# Patient Record
Sex: FEMALE | Race: WHITE | Employment: OTHER | URBAN - NONMETROPOLITAN AREA
[De-identification: names, ages, dates, MRNs, and addresses within clinical notes are randomized per-mention and may not be internally consistent; named-entity substitution may affect disease eponyms.]

---

## 2019-06-17 ENCOUNTER — HOSPITAL ENCOUNTER (OUTPATIENT)
Dept: AUDIOLOGY | Age: 84
Discharge: HOME | End: 2019-06-17
Payer: MEDICARE

## 2019-06-17 PROCEDURE — 92567 TYMPANOMETRY: CPT | Performed by: AUDIOLOGIST

## 2019-06-17 PROCEDURE — 92557 COMPREHENSIVE HEARING TEST: CPT | Performed by: AUDIOLOGIST

## 2019-06-17 NOTE — PROGRESS NOTES
ACCOUNT #: [de-identified]      AUDIOLOGICAL EVALUATION      REASON FOR TESTING:   Patient has a longstanding history of hearing loss. She previously wore a BTE hearing aid in her left ear but it is not working properly anymore. OTOSCOPY: clear right ear canal, black cerumen in the left ear canal     AUDIOGRAM        Reliability: good  Audiometer Used:  GSI-61    PURE TONES     RE    LE     []   [] WNL        []   [] Mild    []   [] Moderate       []   [] Mod-Severe   [x]   [x] Severe    [x]   [x] Profound    TYPE     RE    LE    [x]   [x] SNHL    []   []  Conductive HL    []   [] Mixed HL      CONFIGURATION    RE    LE    []   [] Essentially Flat     [x]   [x]  Sloping  []   [] Steeply Sloping  []   []  Rising  []   [] Cookie Bite    SPEECH AUDIOMETRY   Right Left Sound Field Aided   PTA 85 85      80     SAT       MASKING       % WRS   QUIET 3/5 4/5      15 SL 15 SL     %WRS   NOISE              MCL       UCL            Live Voice  [x]     Recorded  []     List   []     WORD RECOGNITION   RE    LE  []   []  Excellent    [x]   [x]  Good  []   [] Fair  []   [] Poor  []   [] Very Poor    TYMPANOGRAMS  RE    LE  [x]   [x]  Normal compliance    []   []  Reduced mobility  []   [] Hyper mobility  [x]   [x] Normal middle ear pressure  []   [] Negative middle ear pressure  []   [] Positive middle ear pressure  []   [] Flat w/normal ECV  []   [] Flat w/large ECV  []   [] Patent PE tube  []   [] Non-Patent PE tube  []   [] Could Not Test    COMMENTS:  Pure tone results indicate a severe to profound sensorineural hearing loss bilaterally. Word recognition is good bilaterally. Tympanometry revealed normal middle ear function bilaterally. RECOMMENDATION(S):   1.  New amplification is recommended for both ears. A prior authorization will be submitted to patient's [facility] for new Phonak Ebony BTE hearing aids.   2.  Ear impressions will be completed following cerumen removal. Patient will use Debrox drops and contact her physician for removal.

## 2019-07-01 ENCOUNTER — HOSPITAL ENCOUNTER (OUTPATIENT)
Dept: AUDIOLOGY | Age: 84
Discharge: HOME | End: 2019-07-01

## 2019-07-01 PROCEDURE — 9990000010 HC NO CHARGE VISIT: Performed by: AUDIOLOGIST

## 2019-07-01 NOTE — PROGRESS NOTES
Ear impressions completed for both ears. Otoscopy revealed dried black blood or cerumen in the left EAC. Patient had cerumen removed in that ear by Barbara CALDERON in [facility]. Nursing home is now putting antibiotic drops in the ear. Will submit a prior authorization to Medicaid for Phonak Ebony B30 SP BTE hearing aids.

## 2019-07-19 ENCOUNTER — TELEPHONE (OUTPATIENT)
Dept: AUDIOLOGY | Age: 84
End: 2019-07-19

## 2019-07-31 ENCOUNTER — TELEPHONE (OUTPATIENT)
Dept: AUDIOLOGY | Age: 84
End: 2019-07-31

## 2019-08-07 ENCOUNTER — HOSPITAL ENCOUNTER (OUTPATIENT)
Dept: AUDIOLOGY | Age: 84
Discharge: HOME | End: 2019-08-07
Payer: MEDICAID

## 2019-08-07 PROCEDURE — V5160 DISPENSING FEE BINAURAL: HCPCS | Performed by: AUDIOLOGIST

## 2019-08-07 PROCEDURE — V5261 HEARING AID, DIGIT, BIN, BTE: HCPCS | Performed by: AUDIOLOGIST

## 2019-08-07 NOTE — PROGRESS NOTES
ACCT#: [de-identified]    DIAGNOSIS: Bilateral sensorineural hearing loss. NEW HEARING AID FITTING: Dispensed Phonak Ebony B30SP BTE hearing aid(s) for  both ear(s). Explained care, use and insertion. Programmed. Hearing aid fitting  recheck scheduled.

## 2019-10-14 ENCOUNTER — HOSPITAL ENCOUNTER (OUTPATIENT)
Dept: AUDIOLOGY | Age: 84
Discharge: HOME | End: 2019-10-14
Payer: MEDICAID

## 2019-10-14 PROCEDURE — 9990000010 HC NO CHARGE VISIT: Performed by: AUDIOLOGIST

## 2019-10-14 PROCEDURE — V5266 BATTERY FOR HEARING DEVICE: HCPCS | Performed by: AUDIOLOGIST

## 2019-12-19 ENCOUNTER — HOSPITAL ENCOUNTER (OUTPATIENT)
Dept: AUDIOLOGY | Age: 84
Discharge: HOME | End: 2019-12-19

## 2019-12-19 PROCEDURE — 9990000010 HC NO CHARGE VISIT: Performed by: AUDIOLOGIST

## 2020-06-04 ENCOUNTER — TELEPHONE (OUTPATIENT)
Dept: AUDIOLOGY | Age: 85
End: 2020-06-04

## 2020-06-16 ENCOUNTER — TELEPHONE (OUTPATIENT)
Dept: AUDIOLOGY | Age: 85
End: 2020-06-16

## 2020-08-04 ENCOUNTER — TELEPHONE (OUTPATIENT)
Dept: AUDIOLOGY | Age: 85
End: 2020-08-04

## 2020-08-04 NOTE — TELEPHONE ENCOUNTER
Spoke with the patient's niece Edwin Patel today. She explains that Bo Agarwal has not been hearing well. She wants ITE hearing aids. I explained that we are unable to return the BTE hearing aids at this point. Also, I feel that Sofia Medina selected the appropriate hearing aids based on the patient's degree of hearing loss. I recommended that Edwin Patel ask the nursing facility to check Ivonne's ears for wax. Amadeo Cleverly will verify that the hearing aids have good batteries in them and check to see if they are working. I explained that she can make an appointment for us to figure out what is going on, if the patient is able to come in. She thanked me and will let me know if we need to do anything further.